# Patient Record
Sex: MALE | Race: WHITE | Employment: FULL TIME | URBAN - METROPOLITAN AREA
[De-identification: names, ages, dates, MRNs, and addresses within clinical notes are randomized per-mention and may not be internally consistent; named-entity substitution may affect disease eponyms.]

---

## 2024-04-26 ENCOUNTER — TELEPHONE (OUTPATIENT)
Age: 39
End: 2024-04-26

## 2024-04-26 ENCOUNTER — OFFICE VISIT (OUTPATIENT)
Age: 39
End: 2024-04-26

## 2024-04-26 VITALS
TEMPERATURE: 98.3 F | SYSTOLIC BLOOD PRESSURE: 114 MMHG | RESPIRATION RATE: 18 BRPM | WEIGHT: 22 LBS | HEART RATE: 77 BPM | OXYGEN SATURATION: 98 % | DIASTOLIC BLOOD PRESSURE: 73 MMHG

## 2024-04-26 DIAGNOSIS — K61.0 PERIANAL ABSCESS: Primary | ICD-10-CM

## 2024-04-26 RX ORDER — METRONIDAZOLE 500 MG/1
500 TABLET ORAL 3 TIMES DAILY
Qty: 21 TABLET | Refills: 0 | Status: SHIPPED | OUTPATIENT
Start: 2024-04-26 | End: 2024-05-03

## 2024-04-26 RX ORDER — CIPROFLOXACIN 500 MG/1
500 TABLET, FILM COATED ORAL 2 TIMES DAILY
Qty: 14 TABLET | Refills: 0 | Status: SHIPPED | OUTPATIENT
Start: 2024-04-26 | End: 2024-05-03

## 2024-04-26 NOTE — PATIENT INSTRUCTIONS
You were seen today for a perianal abscess  Abscess has ruptured and drained and there is nothing for me to drain in clinic today  Antibiotics as ordered  Will treat with Ciprofloxacin and Metronidazole for 7 days  Patient advised of risks of medications  Continue to monitor the abscess, clean daily and apply an triple antibiotic ointment to avoid contamination  If symptoms return or worsen, I would recommend following up with general surgeon for evaluation, referral placed  Go to the ED with any high fevers, chills, lethargy or rapidly spreading redness, swelling

## 2024-04-26 NOTE — TELEPHONE ENCOUNTER
Patient referred to us from [facility] Rivera md for perianal abscess, please review note to see if we can treat

## 2024-04-26 NOTE — PROGRESS NOTES
General: He is not in acute distress.     Appearance: Normal appearance. He is not ill-appearing.   HENT:      Head: Normocephalic and atraumatic.   Cardiovascular:      Rate and Rhythm: Normal rate and regular rhythm.      Pulses: Normal pulses.   Pulmonary:      Effort: Pulmonary effort is normal. No respiratory distress.   Genitourinary:     Rectum: Mass and tenderness present.       Skin:     General: Skin is warm and dry.   Neurological:      General: No focal deficit present.      Mental Status: He is alert and oriented to person, place, and time.               An electronic signature was used to authenticate this note.    CHINTAN Del Rosario NP

## 2024-05-17 ENCOUNTER — OFFICE VISIT (OUTPATIENT)
Age: 39
End: 2024-05-17

## 2024-05-17 VITALS
DIASTOLIC BLOOD PRESSURE: 70 MMHG | HEART RATE: 72 BPM | BODY MASS INDEX: 28.35 KG/M2 | OXYGEN SATURATION: 97 % | SYSTOLIC BLOOD PRESSURE: 109 MMHG | HEIGHT: 70 IN | TEMPERATURE: 98.1 F | WEIGHT: 198 LBS

## 2024-05-17 DIAGNOSIS — K61.0 PERIANAL ABSCESS: Primary | ICD-10-CM

## 2024-05-17 PROCEDURE — 99243 OFF/OP CNSLTJ NEW/EST LOW 30: CPT | Performed by: SURGERY

## 2024-05-17 RX ORDER — DEXTROAMPHETAMINE SACCHARATE, AMPHETAMINE ASPARTATE, DEXTROAMPHETAMINE SULFATE AND AMPHETAMINE SULFATE 5; 5; 5; 5 MG/1; MG/1; MG/1; MG/1
20 TABLET ORAL DAILY
COMMUNITY

## 2024-05-17 ASSESSMENT — PATIENT HEALTH QUESTIONNAIRE - PHQ9
SUM OF ALL RESPONSES TO PHQ QUESTIONS 1-9: 0
SUM OF ALL RESPONSES TO PHQ9 QUESTIONS 1 & 2: 0
SUM OF ALL RESPONSES TO PHQ QUESTIONS 1-9: 0
SUM OF ALL RESPONSES TO PHQ QUESTIONS 1-9: 0
2. FEELING DOWN, DEPRESSED OR HOPELESS: NOT AT ALL
SUM OF ALL RESPONSES TO PHQ QUESTIONS 1-9: 0
1. LITTLE INTEREST OR PLEASURE IN DOING THINGS: NOT AT ALL

## 2024-05-17 NOTE — PROGRESS NOTES
Please note that this dictation was completed with [employer], the computer voice recognition software.  Quite often unanticipated grammatical, syntax, homophones, and other interpretive errors are inadvertently transcribed by the software.  Please disregard these errors.  Please excuse any errors that have escaped final proofreading.      Encounter Date: 5/17/2024  History and Physical    Assessment:   Perianal abscess drained about a month ago and is now quiescent.  On ultrasound in the office today there does appear to be a tract extending up towards the anal columns.    Body mass index is 28.41 kg/m².    No personal or family history of IBD.    Plan:   I give him 3 options however.  The first option would be to do sitz bath's and watchful waiting.  The second option, would be to open it up today and do packing to see if the tract scars down.  The third option would be referral to our colleagues at [facility] for possible seton.    He is presently uninsured but working on that and would prefer watchful waiting at this point.  They did ask if I could put in a referral to [facility] in case it flares up again.  I will do so.    HPI:   Ryan Gipson Jr. is a 38 y.o. male who is seen for an abscess on his buttock.  He says it got really swollen towards the end of April and ruptured on 4/26/2024.  He went to the emergency department and they treated him with Cipro and Flagyl which he has completed.  It is no longer draining and it is no longer swollen but it remains a little bit tender.  He has no personal or family history of IBD.  This is the first time this has ever happened.  He did not notice any blood in his stool.    Past Medical History:   Diagnosis Date    GERD (gastroesophageal reflux disease)      Past Surgical History:   Procedure Laterality Date    OTHER SURGICAL HISTORY  2004    plantar wart removal      History reviewed. No pertinent family history.  Social History

## 2024-05-17 NOTE — PROGRESS NOTES
Identified pt with two pt identifiers (name and ). Reviewed chart in preparation for visit and have obtained necessary documentation.    Ryan Gipson Jr. is a 38 y.o. male  Chief Complaint   Patient presents with    Skin Problem     Seen at the request of Duke Rivera evaluate cyst on buttock      /70 (Site: Right Upper Arm, Position: Sitting, Cuff Size: Large Adult)   Pulse 72   Temp 98.1 °F (36.7 °C) (Temporal)   Ht 1.778 m (5' 10\")   Wt 89.8 kg (198 lb)   SpO2 97%   BMI 28.41 kg/m²     1. Have you been to the ER, urgent care clinic since your last visit?  Hospitalized since your last visit?no    2. Have you seen or consulted any other health care providers outside of the [facility] since your last visit?  Include any pap smears or colon screening. no

## 2024-06-24 ENCOUNTER — TELEPHONE (OUTPATIENT)
Age: 39
End: 2024-06-24

## 2024-06-24 NOTE — TELEPHONE ENCOUNTER
Pt wants to know if we can call in an abx for his perianal abscess again. He said is it flaring back up and draining [phone]

## 2024-06-25 ENCOUNTER — TELEPHONE (OUTPATIENT)
Age: 39
End: 2024-06-25

## 2024-06-25 DIAGNOSIS — K61.0 PERIANAL ABSCESS: Primary | ICD-10-CM

## 2024-06-25 NOTE — TELEPHONE ENCOUNTER
Got his message about drainage from the perianal area again.  Told him I suspect perianal fistula rather than just abscess and that I will get him referred over to a colorectal surgeon as we had discussed previously.  Told him to do sitz bath's twice daily but that no antibiotics are necessary as long as it is draining.  If he were to have increasing pain he should call his primary care doctor or myself to be seen.